# Patient Record
(demographics unavailable — no encounter records)

---

## 2024-11-17 NOTE — REVIEW OF SYSTEMS
[Fatigue] : fatigue [Abdominal Pain] : abdominal pain [Constipation] : constipation [Muscle Pain] : muscle pain [FreeTextEntry2] : dizzy/weak spells after eating sugar [FreeTextEntry7] : divertoculosis, nausea and esophageal pressure in April 2024- Mi workup negative and symtpoms self-limiting. [FreeTextEntry8] : fruity smelling urine. [de-identified] : mildly tender left neck lymph node [Skin Rash] : no skin rash [Skin Wound] : no skin wound [Negative] : Genitourinary [FreeTextEntry3] : occasional floaters [FreeTextEntry9] : left shoulder pain 2/2 traumatic accident. Cervical radiculopathy s/p epidural.  [de-identified] : BCC lateral to left eye.  MOHs procedure completed Summer 2024.

## 2024-11-17 NOTE — PHYSICAL EXAM
[Fully active, able to carry on all pre-disease performance without restriction] : Status 0 - Fully active, able to carry on all pre-disease performance without restriction [Normal] : supple without JVD, no thyromegaly or masses appreciated [de-identified] : no palpable LAD [de-identified] : decreased ROM left shoulder, pain on palpation of left shoulder blade. [de-identified] : generalized xerosis, BCC lateral to left eye

## 2024-11-17 NOTE — REASON FOR VISIT
[Initial Consultation] : an initial consultation for [Follow-Up Visit] : a follow-up visit for [FreeTextEntry2] : thalassemia minor and mild thrombocytopenia

## 2024-11-17 NOTE — DISCUSSION/SUMMARY
[FreeTextEntry1] : Called patient to discuss lab results from most recent visit. 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  No evidence of active hemolysis.   - B12/Folate levels normal- pt inquired if OK to take supplementation regardless to see if improves energy and explained OK to trial given the body will release whatever it doesn't utilize. - Viral screening for transient mild thrombocytopenia- results negative. - Iron studies normal, not contributing to anemia - Smear review performed- +schistocytes, target cells, pencil cells, teardrops, rare spherocytes consistent with known beta thalassemia trait +/- hypothyroidism.  No rouleaux formation noted. - RTC 3 months to monitor anemia and thrombocytopenia, will check MGUS serologies given age>50 with anemia. - Consider BmBx if counts decline below baseline.

## 2024-11-17 NOTE — HISTORY OF PRESENT ILLNESS
[de-identified] : 64 YO female with intermittent mild anemia secondary to Beta Thalassemia Minor, mild thrombocytopenia and elevated light chain ratio presents for follow-up.  Interval history: Since last visit has had worsening left neck and shoulder pain in the area of supraspinatus.  She has been unsatisfied with the input she has received so far from orthopedists, is pending shoulder imaging in the net week.  MOHs procedure went well, no prolonged bleeding.  Reports stable fatigue but still profound at times.   Today she is not anemic- Hgb 11.9.  Platelet count stable at 140.  No reports of dizzy spells since last visit.

## 2024-11-17 NOTE — ASSESSMENT
[FreeTextEntry1] : Mrs. Leo is a 67-year-old female with Beta Thalassemia Minor associated with intermittent mild anemia and mild thrombocytopenia which is possibly ITP- history of severe drop in count during pregnancy and during prior viral illness).  Also with elevated light chain ratio but no identified paraprotein.   #Beta Thalassemia Minor - intermittent mild anemia and chronic microcytosis related to underlying beta thal.  - No evidence of iron deficiency or blood loss- recent colonoscopy normal - No clear benefit to B12 supplement- can stop - Hemolysis screen- retic/hapto/LDH- pending - Elevated light chain ratio likely unrelated to intermittent mild anemia  #elevated light chain ratio - possibly inflammatory/acute phase reactant - possibly light chain MGUS- in absence of CRAB or imaging suggesting lytic lesions, would defer bone marrow biopsy at this time and observe.  RTC 4 months

## 2024-11-17 NOTE — HISTORY OF PRESENT ILLNESS
[de-identified] : 66 YO female with intermittent mild anemia secondary to Beta Thalassemia Minor, mild thrombocytopenia and elevated light chain ratio presents for follow-up.  Interval history: Since last visit has had worsening left neck and shoulder pain in the area of supraspinatus.  She has been unsatisfied with the input she has received so far from orthopedists, is pending shoulder imaging in the net week.  MOHs procedure went well, no prolonged bleeding.  Reports stable fatigue but still profound at times.   Today she is not anemic- Hgb 11.9.  Platelet count stable at 140.  No reports of dizzy spells since last visit.

## 2024-11-17 NOTE — REVIEW OF SYSTEMS
[Fatigue] : fatigue [Abdominal Pain] : abdominal pain [Constipation] : constipation [Muscle Pain] : muscle pain [FreeTextEntry2] : dizzy/weak spells after eating sugar [FreeTextEntry7] : divertoculosis, nausea and esophageal pressure in April 2024- Mi workup negative and symtpoms self-limiting. [FreeTextEntry8] : fruity smelling urine. [de-identified] : mildly tender left neck lymph node [Skin Rash] : no skin rash [Skin Wound] : no skin wound [Negative] : Genitourinary [FreeTextEntry3] : occasional floaters [FreeTextEntry9] : left shoulder pain 2/2 traumatic accident. Cervical radiculopathy s/p epidural.  [de-identified] : BCC lateral to left eye.  MOHs procedure completed Summer 2024.

## 2024-11-17 NOTE — PHYSICAL EXAM
[Fully active, able to carry on all pre-disease performance without restriction] : Status 0 - Fully active, able to carry on all pre-disease performance without restriction [Normal] : supple without JVD, no thyromegaly or masses appreciated [de-identified] : no palpable LAD [de-identified] : decreased ROM left shoulder, pain on palpation of left shoulder blade. [de-identified] : generalized xerosis, BCC lateral to left eye

## 2024-12-06 NOTE — ASSESSMENT
[FreeTextEntry1] : >> Imaging and Other Studies  I personally reviewed the relevant imaging.  Discussed and explained to patient the likely source of pathology and pain.  Questions answered. CT  >> Therapy and Other Modalities  start PT   >> Medications  acetaminophen 650mg q8h prn pain (caution <3g daily)   >> Interventions  MRI demonstrating disc herniation causing radiculopathy, significantly impactful to ability to perform ADL and refractory to conservative treatments, including physician directed exercises/PT.  sp C7-T1 interlaminar epidural steroid injection with significant improvement    shoulder pain likely secondary to significant joint arthropathy demonstrated on imaging refractory to conservative treatments. Will schedule LEFT glenohumeral joint steroid injection r/b/a discussed  >> Consults  fu with shoulder specialist regarding full thickness tear  may consider EMG  >> Discussion of Risks/Benefits/Alternatives  	>Regarding any scheduled procedures:  I have discussed in detail with the patient that any interventional pain procedure is associated with potential risks.  The procedure may include an injection of steroids and potentially other medications (local anesthetic and normal saline) into the epidural space or surrounding tissue of the spine.  There are significant risks of this procedure which include and are not limited to infection, bleeding, worsening pain, dural puncture leading to postdural puncture headache, nerve damage, spinal cord injury, paralysis, stroke, and death.    There is a chance that the procedure does not improve their pain.    There are risks associated with the steroid being absorbed into the body systemically.  These include dysphoria, difficulty sleeping, mood swings and personality changes.  Premenopausal women may notice an irregularity in her menstrual cycle for 2-3 months following the injection.  Steroids can specifically affect patients with hypertension, diabetes, and peptic ulcers.  The procedure may cause a temporary increase in blood pressure and blood pressure, and may adversely affect a peptic ulcer.  Other, more rare complications, include avascular necrosis of joints, glaucoma and worsening of osteoporosis.   I have discussed the risks of the procedure at length with the patient, and the potential benefits of pain relief.  I have offered alternatives to the procedure.  All questions were answered.    The patient expressed understanding and wishes to proceed with the procedure.   > Longitudinal management of Complex Painful condition   The patient is being managed for a complex condition that requires ongoing management.  The nature of this condition demands nuanced approach to treatment.  The seriousness of the condition necessitates an in-depth and focused approach to management and coordination with other healthcare professionals.    This visit involves intricate evaluation and management of the patient's condition.  The complexity of the visit was due to the need for detailed assessment of the current state, consideration of potential complications and a careful balancing of treatment options to management the chronic condition effectively.   As detailed above, the patient has a chronic significant painful condition that requires regular and detailed management.  The condition's impact on the patient's quality of life and health is substantial and necessitates a comprehensive and tailored approach   >> Conclusion   There were no barriers to communication. Informed patient that I would be available for any additional questions. Patient was instructed to call with any worsening symptoms including severe pain, new numbness/weakness, or changes in the bowel/bladder function. Discussed role of nsaids in pain management and all relevant risks, if patient is continuing to require after 4 weeks the patient should f/u for alternative treatment. Instructed patient to maintain pain diary to monitor pain level, mobility, and function.  I explained to patient benefits and limitation of TeleMedicine visits  Patient understands that limitations include inability to perform comprehensive physical exam, which may lead to potential diagnostic inconsistencies.    Any scheduled procedures are based on history, imaging and limited physical exam performed on TeleHealth visit.  If necessary, additional focal physical exam will be performed on date of procedure  Patient understands that diagnosis and treatment may be limited by these inconsistencies and patient agrees to proceed with care plan

## 2024-12-06 NOTE — PROCEDURE
[FreeTextEntry1] : SHOULDER INJECTION ULTRASOUND LEFT  The patient was given opportunity to ask questions regarding the procedure, its indications and the associated risks.  The risks of the procedure discussed include but are not limited to infection, bleeding, allergic reactions, nerve injuries, and side effects.  The patient showed understanding and wished to proceed.  After obtaining informed consent, the patient's chart was reviewed, the site of operation was marked, and the patient's identification was confirmed.  The patient was brought to the Exam Room and placed in a seated position on the exam room table with the] [LEFT hand resting on the contralateral shoulder. Strict sterile technique was used.  Skin was prepped with Chlorhexadine solution and draped in sterile manner.  Using sterile technique, a high-frequency, linear-array ultrasound probe was placed in a position just caudad to the acromion and parallel with the spine of the scapula. The area between the glenoid and humeral head was identified.  An entry point just lateral to the ultrasound probe was anesthetized using a [30]g needle and [1]cc 1% lidocaine. Following this, a [21]g needle was inserted using an in-plane technique with the ultrasound such that the needle shaft and tip were visualized. When the needle tip reached the junction between the glenoid and humeral head, and after negative aspiration for heme, a mixture of 20mg kenalog with 4cc 0.75% bupivacaine was injected. The needle was then flushed with lidocaine and removed.  A band-aid dressing was applied.  The patient tolerated the procedure well and there were no immediate adverse event. The patient was asked to follow up in 2 weeks and was instructed to call with fever, chills, increased pain, redness or swelling at the injection site, numbness or weakness.

## 2024-12-06 NOTE — HISTORY OF PRESENT ILLNESS
[Neck Pain] : neck pain [___ mths] : [unfilled] month(s) ago [6] : an average pain level of 6/10 [9] : a maximum pain level of 9/10 [Burning] : burning [Stabbing] : stabbing [Medications] : medications [FreeTextEntry1] : Interval Note:  sp C7-T1 interlaminar epidural steroid injection with 80% improvement in left shoulder elbow and wrist pain.  patient reports that she is doing much better.  Not utilizing analgesics.   Doing well and traveling.  denies any worsening numbness, weakness, bowel/bladder dysfunction.   HPI  Ms. SOLE LEE is a 66 year F with hlp, Mediterranean anemia presents with presents with left neck pain radiating to left shoulder, difficulty turning her neck.  Pain is so bad that patient finds it difficult to perform adls, reaching for objects. denies any worsening numbness, weakness, bowel/bladder dysfunction.    Previous and current pain medications/doses/effects:  medrol pack  Previous Pain Treatments:  PT  Previous Pain Injections:  C7-T1 interlaminar epidural steroid injection 5/28/24  Previous Diagnostic Studies/Images:  MRI CS 10/24  Cervical spondylosis C3-4 bulging disc effaces the subarachnoid space C4-5 ridge effaces subarachnoid spaces and causes subarachnoid space and causes moderate bilateral foraminal narrowing C5-6 bulging of the disc and dorsal ridge effaces suaracnhoid spaaces C6-7 right paracentral disc  MRI L Shoulder  10/24  5mm full thickness tear of supraspinatus GH arthrosis  CT CS 12/11/23  Alignment is anatomic without spondylolisthesis. Vertebral body heights are preserved without compression deformity. Alignment at the craniocervical, atlantoaxial, and atlantodental articulations is preserved. The prevertebral and paraspinous soft tissues are unremarkable. The lung apices are clear. Subcentimeter low-attenuation focus within the left thyroid lobe. There are multilevel degenerative changes including facet joint arthrosis and uncovertebral joint arthrosis, endplate productive changes and loss of the intervertebral disc space height. Please note, the spinal canal and its contents are not well evaluated given CT modality Level-by-level analysis of the cervical spine demonstrates the following:  C2-C3: No bony spinal canal or neural foraminal narrowing.  C3-C4: Bilateral uncovertebral joint arthrosis more pronounced on the right. Right facet joint arthrosis. Moderate right bony foraminal narrowing. No left bony foraminal narrowing. No high-grade bony spinal canal narrowing.  C4-C5: Left greater than right uncovertebral joint arthrosis. Moderate right and mild left bony foraminal narrowing. No high-grade bony spinal canal narrowing.  C5-C6: Bilateral uncovertebral joint arthrosis. Moderate right and mild left bony foraminal narrowing. No high-grade bony spinal canal narrowing.  C6-C7: Bilateral uncovertebral joint arthrosis. Bilateral facet arthrosis. Mild bilateral bony foraminal narrowing. No high-grade bony spinal canal narrowing.  C7-T1: Bilateral facet joint arthrosis. No bony spinal canal or neural foraminal narrowing.  T1-T2: No bony spinal canal or neural foraminal narrowing.   IMPRESSION: Multilevel degenerative changes resulting in up to moderate bony foraminal narrowing and no high-grade bony spinal canal narrowing as described.  MRI CS 2023  LABRUM: There is a small nondisplaced linear tear across the base of the anterosuperior labrum. There is a small amount of mineralization within the superior labrum HYALINE CARTILAGE AND SUBCHONDRAL BONE: The hyaline cartilage is intact. There is no subchondral edema. JOINT MORPHOLOGY: Small acetabular osteophyte formation MUSCLE AND TENDONS: Mild insertional tendinosis of the gluteus minimus with minimal undersurface partial tearing and mild peritendinous soft tissue edema. The remaining abductors, adductors, iliopsoas and hamstring muscles and tendons are normal. SYNOVIUM/JOINT FLUID: There is a physiologic amount of joint fluid BONE MARROW: There is no bone marrow edema or fracture. NEUROVASCULAR STRUCTURES: Normal INTRAPELVIC AND PERIPHERAL SOFT TISSUES: Normal  IMPRESSION: No fracture.  Mild insertional tendinosis and minimal undersurface partial tearing of the gluteus minimus.  Small nondisplaced and likely chronic labral tear with adjacent labral mineralization and very small acetabular osteophyte formation  MRI LS 2021  Vertebral body height, marrow signal homogeneity, and alignment are maintained throughout the visualized spinal segments.  Disc space narrowing at L5-S1.  The conus is normal in size, position, and signal characteristics, ending at T12.  T11-T12: No spinal canal stenosis or neural foraminal narrowing.  T12-L1: No spinal canal stenosis or neural foraminal narrowing.  L1-L2: No spinal canal stenosis or neural foraminal narrowing.  L2-L3: No spinal canal stenosis or neural foraminal narrowing.  L3-L4: Mild broad-based disc protrusion and mild bilateral facet/ligamentous hypertrophy. Minimal thecal sac compression. No neural foraminal narrowing.  L4-L5: Mild broad-based disc protrusion and bilateral facet/ligamentous hypertrophy. Mild thecal sac compression. No neural foraminal narrowing.  L5-S1: Mild disc bulge which does not exert mass effect upon the descending S1 nerve roots or thecal sac. Mild to moderate bilateral neural foraminal narrowing.  IMPRESSION:  Vertebral body height, marrow signal homogeneity, and alignment are maintained.  Multilevel degenerative changes as detailed in the body the report.  Mild multilevel spinal canal stenosis.  L5-S1 mild to moderate bilateral neural foraminal narrowing.

## 2024-12-06 NOTE — PHYSICAL EXAM
[Normal] : Well developed, in no acute distress, alert and oriented to person, place and time [Normal muscle bulk without asymmetry] : normal muscle bulk without asymmetry [Webb] : positive Webb Test [Neer's] : positive Neer's Test [Facet Tenderness] : no facet tenderness

## 2024-12-20 NOTE — ASSESSMENT
[FreeTextEntry1] : >> Imaging and Other Studies  I personally reviewed the relevant imaging.  Discussed and explained to patient the likely source of pathology and pain.  Questions answered. CT  >> Therapy and Other Modalities  start PT   >> Medications  acetaminophen 650mg q8h prn pain (caution <3g daily)   >> Interventions  MRI demonstrating disc herniation causing radiculopathy, significantly impactful to ability to perform ADL and refractory to conservative treatments, including physician directed exercises/PT.  sp C7-T1 interlaminar epidural steroid injection with significant improvement    shoulder pain likely secondary to significant joint arthropathy demonstrated on imaging refractory to conservative treatments sp LEFT glenohumeral joint steroid injection with improvement in pain  >> Consults  fu with shoulder specialist regarding full thickness tear  may consider EMG  >> Discussion of Risks/Benefits/Alternatives  	>Regarding any scheduled procedures:  I have discussed in detail with the patient that any interventional pain procedure is associated with potential risks.  The procedure may include an injection of steroids and potentially other medications (local anesthetic and normal saline) into the epidural space or surrounding tissue of the spine.  There are significant risks of this procedure which include and are not limited to infection, bleeding, worsening pain, dural puncture leading to postdural puncture headache, nerve damage, spinal cord injury, paralysis, stroke, and death.    There is a chance that the procedure does not improve their pain.    There are risks associated with the steroid being absorbed into the body systemically.  These include dysphoria, difficulty sleeping, mood swings and personality changes.  Premenopausal women may notice an irregularity in her menstrual cycle for 2-3 months following the injection.  Steroids can specifically affect patients with hypertension, diabetes, and peptic ulcers.  The procedure may cause a temporary increase in blood pressure and blood pressure, and may adversely affect a peptic ulcer.  Other, more rare complications, include avascular necrosis of joints, glaucoma and worsening of osteoporosis.   I have discussed the risks of the procedure at length with the patient, and the potential benefits of pain relief.  I have offered alternatives to the procedure.  All questions were answered.    The patient expressed understanding and wishes to proceed with the procedure.   > Longitudinal management of Complex Painful condition   The patient is being managed for a complex condition that requires ongoing management.  The nature of this condition demands nuanced approach to treatment.  The seriousness of the condition necessitates an in-depth and focused approach to management and coordination with other healthcare professionals.    This visit involves intricate evaluation and management of the patient's condition.  The complexity of the visit was due to the need for detailed assessment of the current state, consideration of potential complications and a careful balancing of treatment options to management the chronic condition effectively.   As detailed above, the patient has a chronic significant painful condition that requires regular and detailed management.  The condition's impact on the patient's quality of life and health is substantial and necessitates a comprehensive and tailored approach   >> Conclusion   There were no barriers to communication. Informed patient that I would be available for any additional questions. Patient was instructed to call with any worsening symptoms including severe pain, new numbness/weakness, or changes in the bowel/bladder function. Discussed role of nsaids in pain management and all relevant risks, if patient is continuing to require after 4 weeks the patient should f/u for alternative treatment. Instructed patient to maintain pain diary to monitor pain level, mobility, and function.  I explained to patient benefits and limitation of TeleMedicine visits  Patient understands that limitations include inability to perform comprehensive physical exam, which may lead to potential diagnostic inconsistencies.    Any scheduled procedures are based on history, imaging and limited physical exam performed on TeleHealth visit.  If necessary, additional focal physical exam will be performed on date of procedure  Patient understands that diagnosis and treatment may be limited by these inconsistencies and patient agrees to proceed with care plan

## 2024-12-20 NOTE — HISTORY OF PRESENT ILLNESS
[Neck Pain] : neck pain [___ mths] : [unfilled] month(s) ago [6] : an average pain level of 6/10 [9] : a maximum pain level of 9/10 [Burning] : burning [Stabbing] : stabbing [Medications] : medications [FreeTextEntry1] : Interval Note: sp LEFT glenohumeral joint steroid injection 12/6/24 with significant improvement in left shoulder pain.  Patient reports that she is having some return of shoulder pain but overall  denies any worsening numbness, weakness, bowel/bladder dysfunction.   HPI  Ms. SOLE LEE is a 66 year F with hlp, Mediterranean anemia presents with presents with left neck pain radiating to left shoulder, difficulty turning her neck.  Pain is so bad that patient finds it difficult to perform adls, reaching for objects. denies any worsening numbness, weakness, bowel/bladder dysfunction.    Previous and current pain medications/doses/effects:  medrol pack  Previous Pain Treatments:  PT  Previous Pain Injections: LEFT glenohumeral joint steroid injection 12/6/24 C7-T1 interlaminar epidural steroid injection 5/28/24  Previous Diagnostic Studies/Images:  MRI CS 10/24  Cervical spondylosis C3-4 bulging disc effaces the subarachnoid space C4-5 ridge effaces subarachnoid spaces and causes subarachnoid space and causes moderate bilateral foraminal narrowing C5-6 bulging of the disc and dorsal ridge effaces suaracnhoid spaaces C6-7 right paracentral disc  MRI L Shoulder  10/24  5mm full thickness tear of supraspinatus GH arthrosis  CT CS 12/11/23  Alignment is anatomic without spondylolisthesis. Vertebral body heights are preserved without compression deformity. Alignment at the craniocervical, atlantoaxial, and atlantodental articulations is preserved. The prevertebral and paraspinous soft tissues are unremarkable. The lung apices are clear. Subcentimeter low-attenuation focus within the left thyroid lobe. There are multilevel degenerative changes including facet joint arthrosis and uncovertebral joint arthrosis, endplate productive changes and loss of the intervertebral disc space height. Please note, the spinal canal and its contents are not well evaluated given CT modality Level-by-level analysis of the cervical spine demonstrates the following:  C2-C3: No bony spinal canal or neural foraminal narrowing.  C3-C4: Bilateral uncovertebral joint arthrosis more pronounced on the right. Right facet joint arthrosis. Moderate right bony foraminal narrowing. No left bony foraminal narrowing. No high-grade bony spinal canal narrowing.  C4-C5: Left greater than right uncovertebral joint arthrosis. Moderate right and mild left bony foraminal narrowing. No high-grade bony spinal canal narrowing.  C5-C6: Bilateral uncovertebral joint arthrosis. Moderate right and mild left bony foraminal narrowing. No high-grade bony spinal canal narrowing.  C6-C7: Bilateral uncovertebral joint arthrosis. Bilateral facet arthrosis. Mild bilateral bony foraminal narrowing. No high-grade bony spinal canal narrowing.  C7-T1: Bilateral facet joint arthrosis. No bony spinal canal or neural foraminal narrowing.  T1-T2: No bony spinal canal or neural foraminal narrowing.   IMPRESSION: Multilevel degenerative changes resulting in up to moderate bony foraminal narrowing and no high-grade bony spinal canal narrowing as described.  MRI CS 2023  LABRUM: There is a small nondisplaced linear tear across the base of the anterosuperior labrum. There is a small amount of mineralization within the superior labrum HYALINE CARTILAGE AND SUBCHONDRAL BONE: The hyaline cartilage is intact. There is no subchondral edema. JOINT MORPHOLOGY: Small acetabular osteophyte formation MUSCLE AND TENDONS: Mild insertional tendinosis of the gluteus minimus with minimal undersurface partial tearing and mild peritendinous soft tissue edema. The remaining abductors, adductors, iliopsoas and hamstring muscles and tendons are normal. SYNOVIUM/JOINT FLUID: There is a physiologic amount of joint fluid BONE MARROW: There is no bone marrow edema or fracture. NEUROVASCULAR STRUCTURES: Normal INTRAPELVIC AND PERIPHERAL SOFT TISSUES: Normal  IMPRESSION: No fracture.  Mild insertional tendinosis and minimal undersurface partial tearing of the gluteus minimus.  Small nondisplaced and likely chronic labral tear with adjacent labral mineralization and very small acetabular osteophyte formation  MRI LS 2021  Vertebral body height, marrow signal homogeneity, and alignment are maintained throughout the visualized spinal segments.  Disc space narrowing at L5-S1.  The conus is normal in size, position, and signal characteristics, ending at T12.  T11-T12: No spinal canal stenosis or neural foraminal narrowing.  T12-L1: No spinal canal stenosis or neural foraminal narrowing.  L1-L2: No spinal canal stenosis or neural foraminal narrowing.  L2-L3: No spinal canal stenosis or neural foraminal narrowing.  L3-L4: Mild broad-based disc protrusion and mild bilateral facet/ligamentous hypertrophy. Minimal thecal sac compression. No neural foraminal narrowing.  L4-L5: Mild broad-based disc protrusion and bilateral facet/ligamentous hypertrophy. Mild thecal sac compression. No neural foraminal narrowing.  L5-S1: Mild disc bulge which does not exert mass effect upon the descending S1 nerve roots or thecal sac. Mild to moderate bilateral neural foraminal narrowing.  IMPRESSION:  Vertebral body height, marrow signal homogeneity, and alignment are maintained.  Multilevel degenerative changes as detailed in the body the report.  Mild multilevel spinal canal stenosis.  L5-S1 mild to moderate bilateral neural foraminal narrowing. [Home] : at home, [unfilled] , at the time of the visit. [Medical Office: ([facility])___] : at the medical office located in  [Verbal consent obtained from patient] : the patient, [unfilled]

## 2024-12-20 NOTE — PHYSICAL EXAM
[Normal] : Well developed, in no acute distress, alert and oriented to person, place and time [de-identified] :  Constitutional: Normal, well developed, no acute distress on audio/video examination Eyes: Symmetric, External structures on video examination ENT: Lips, mucosa and tongue normal on video examination Oropharynx: Lips normal, symmetric, no external lesions appreciated appreciated on video examination Respiratory: Non-labored breathing, no audible wheezes appreciated on audio/video examination Vascular: No cyanosis appreciated or edema appreciated on video examination GI:  no jaundice appreciated on video examination Neurovascular: CN grossly intact on video/audio examination, alert MSK: Normal muscle bulk on video examination

## 2025-01-11 NOTE — HISTORY OF PRESENT ILLNESS
[FreeTextEntry8] : Pt reports  frequency and burning sensation on urination since few days. Also left ear discomfort. Today has elevated temp.

## 2025-02-07 NOTE — PLAN
[FreeTextEntry1] :    68-year-old female presents for feeling extreme fatigue.  She says she has been struggling with rotator cuff tear issues for some time the pain has limited her exercise.  She has gained 15 pounds.  Patient continues with physical therapy.  She also complains of poor sleep since her rotator cuff tear.  Recently however she is finds herself sleeping more which she is happy about. her fatigue does seem to concern her and wants to know why she is tired all the time.  She denies any cough upper respiratory symptoms, fevers, chills, abdominal pains, diarrhea.  All other ROS is negative.   Fatigue >> Patient has gained 15 pounds recently, likely explaining why she is fatigued.  She said she had endocrinology visit recently and her thyroid functions at that time were normal >>Patient has a annual visit coming up with her PCP.  Will order blood work. >> Will add on EBV panel for unexplained fatigue

## 2025-02-07 NOTE — HISTORY OF PRESENT ILLNESS
[FreeTextEntry1] : Feeling fatigued [de-identified] : 68-year-old female presents for feeling extreme fatigue.  She says she has been struggling with rotator cuff tear issues for some time the pain has limited her exercise.  She has gained 15 pounds.  Patient continues with physical therapy.  She also complains of poor sleep since her rotator cuff tear.  Recently however she is finds herself sleeping more which she is happy about. her fatigue does seem to concern her and wants to know why she is tired all the time.  She denies any cough upper respiratory symptoms, fevers, chills, abdominal pains, diarrhea.  All other ROS is negative.

## 2025-02-24 NOTE — PLAN
[FreeTextEntry1] : I reviewed recent blood work results with the patient. Reassured her all blood tests were in good range.  I encouraged pt to focus on low fat/ low glycemic diet and increased exercise/walking for weight gain and I will call her in 2 weeks for follow up.

## 2025-02-24 NOTE — REVIEW OF SYSTEMS
[Fatigue] : fatigue [Recent Change In Weight] : ~T recent weight change [FreeTextEntry2] : weight gain of 15 lbs in 3 months and fatigue

## 2025-03-16 NOTE — END OF VISIT
[FreeTextEntry3] :  I, Marie Walden, acted as a scribe on behalf of Dr. Miguel A Hensley M.D. on 03/12/2025.   All medical entries made by the scribe were at my Dr. Miguel A Hensley M.D direction and personally dictated by me on 03/12/2025. I have reviewed the chart and agree that the record accurately reflects my personal performance of the history, physical exam, assessment and plan. I have also personally directed, reviewed, and agreed with the chart.

## 2025-03-16 NOTE — HISTORY OF PRESENT ILLNESS
[FreeTextEntry1] : 68 year old  presents for an annual. Pt was seen last year for intravaginal nodule with CT finding consistent with likely phlebolith.  OBH: ,  x3 (, , ) GYNH: hx of fibroids, denies hx of abnormal pap/HPV PMH: hypothyroidism, thalassemia minor, diverticulitis (followed by Dr. Wilkins) PSH: deviated septum FH: colon cancer (father), multiple myeloma (mother) Allergies: NKDA Medications: synthroid, cytomel

## 2025-03-16 NOTE — PLAN
[FreeTextEntry1] : 68 year old for an annual. stable  Health care maintenance -pap -vit D/exercise/calcium -mammo utd -dexa next year -colon screening reviewed -f/u PCP for annual and appropriate immunizations -rto 1 year

## 2025-03-16 NOTE — PHYSICAL EXAM
[Chaperone Present] : A chaperone was present in the examining room during all aspects of the physical examination [Appropriately responsive] : appropriately responsive [Alert] : alert [No Acute Distress] : no acute distress [Soft] : soft [Non-tender] : non-tender [Non-distended] : non-distended [No HSM] : No HSM [No Lesions] : no lesions [No Mass] : no mass [Oriented x3] : oriented x3 [Examination Of The Breasts] : a normal appearance [No Masses] : no breast masses were palpable [Labia Majora] : normal [Labia Minora] : normal [Normal] : normal [Uterine Adnexae] : normal [FreeTextEntry2] : MACHELLE Esquivel [FreeTextEntry3] :  No thyromegaly

## 2025-05-29 NOTE — HISTORY OF PRESENT ILLNESS
[Never] : never [TextBox_4] : 68F with hypothyroid and anemia  was in her home [place] cabin, cleaning dirty garage, lots of pollen, chest was feeling heavy.  Went to  who sent her to ED, cxr, labs normal, given albuterol and prednisone without significant relief.  Feeling a little better now, not taking anything.  Thought also possibly gerd, not taking meds.  Thinks she had a similar issue last year during pollen season near her [place].  No wheeze/cough.  Took some claritin but no major difference.  took pepcid which did help a bit.  will be seeing cardiology in a few weeks.

## 2025-05-29 NOTE — ASSESSMENT
[FreeTextEntry1] : possibly she had viral illness +/- allergy from being in cabin  normal pft/cxr can trial laba/ics prior to [place] back to the [place] to see if this is helpful cont pepcid or can switch to ppi  fu cardiology   check ddimer r/o PE  A total of 45 minutes was spent on this encounter including history taking, chart review, physical examination, testing interpretation and treatment plan.

## 2025-06-06 NOTE — DISCUSSION/SUMMARY
[EKG obtained to assist in diagnosis and management of assessed problem(s)] : EKG obtained to assist in diagnosis and management of assessed problem(s) [FreeTextEntry1] : EKG today shows:    PLAN: 1.   CP with atypical features. -   I reviewed the patient's prior workup.  Calcium score was minimal on cardiac CT in the past.  Nuclear stress test was unremarkable when done.  Recent ER evaluation did not show evidence of of an acute cardiac problem.  Exam and EKG remain benign today. -   Will have her return for an echocardiogram to assess for any pericardial abnormality or other structural cardiac issue. -   Will prescribe famotidine 40 mg daily for the next 2 months, for the possibility of acid reflux, perhaps with associated esophagitis.  Recommended that she could also use Tums or a liquid antacid on an as needed basis as well.  2.   Reviewing her records, a carotid Doppler done elsewhere in 2016 describe disease in each internal carotid artery.  This is at odds with finding of any cardiac testing for calcium scoring and her unremarkable nuclear stress test last year.  I asked her to return at her convenience for a follow-up carotid Doppler to reassess this finding.  3.  She will continue follow-up with the pulmonologist and also use the inhaler that was prescribed.  I also suggested that she could consider repeat allergy testing, which has not been done in many years.  50 minutes spent on today's office visit including review of prior records.   I will call her with results of the carotid Doppler study once it is available.

## 2025-06-06 NOTE — REASON FOR VISIT
[Follow-Up - Clinic] : a clinic follow-up of [Dyspnea] : dyspnea [FreeTextEntry1] :   Brittney Leo returns for cardiac evaluation because of recent episodes of chest discomfort and breathlessness.